# Patient Record
Sex: FEMALE | Race: ASIAN | URBAN - METROPOLITAN AREA
[De-identification: names, ages, dates, MRNs, and addresses within clinical notes are randomized per-mention and may not be internally consistent; named-entity substitution may affect disease eponyms.]

---

## 2017-03-28 ENCOUNTER — OFFICE VISIT (OUTPATIENT)
Dept: FAMILY MEDICINE | Facility: CLINIC | Age: 22
End: 2017-03-28

## 2017-03-28 VITALS
TEMPERATURE: 98.4 F | HEIGHT: 62 IN | SYSTOLIC BLOOD PRESSURE: 115 MMHG | HEART RATE: 98 BPM | DIASTOLIC BLOOD PRESSURE: 79 MMHG | BODY MASS INDEX: 19.55 KG/M2 | OXYGEN SATURATION: 99 % | WEIGHT: 106.25 LBS

## 2017-03-28 DIAGNOSIS — Z11.1 SCREENING EXAMINATION FOR PULMONARY TUBERCULOSIS: Primary | ICD-10-CM

## 2017-03-28 NOTE — PROGRESS NOTES
"Preceptor attestation:  Vital signs reviewed: /79 (BP Location: Left arm, Patient Position: Chair, Cuff Size: Adult Regular)  Pulse 98  Temp 98.4  F (36.9  C) (Oral)  Ht 5' 2\" (157.5 cm)  Wt 106 lb 4 oz (48.2 kg)  LMP 02/26/2017 (Within Weeks)  SpO2 99%  Breastfeeding? No  BMI 19.43 kg/m2    Patient seen and discussed with the resident. Assessment and plan reviewed with resident and agreed upon.    Supervising physician: Coral Aguila MD  [facility]  "

## 2017-03-28 NOTE — MR AVS SNAPSHOT
After Visit Summary   3/28/2017    Isaías Stone    MRN: [id number]           Patient Information     Date Of Birth          1995        Visit Information        Provider Department      3/28/2017 3:30 PM Amos Decker MD [facility]        Today's Diagnoses     Screening examination for pulmonary tuberculosis    -  1      Care Instructions    Please return for a nursing visit within the next 48-72 hours to have your skin test checked. Do not come earlier than 350pm on 3/30/17    Please set up an appointment for a 40 minute physical exam, you are due for a pap smear        Follow-ups after your visit        Who to contact     Please call your clinic at [phone] to:    Ask questions about your health    Make or cancel appointments    Discuss your medicines    Learn about your test results    Speak to your doctor   If you have compliments or concerns about an experience at your clinic, or if you wish to file a complaint, please contact [facility] Patient Relations at [phone] or email us at [email or web address]         Additional Information About Your Visit        MyChart Information     [email or web address] is an electronic gateway that provides easy, online access to your medical records. With [email or web address], you can request a clinic appointment, read your test results, renew a prescription or communicate with your care team.     To sign up for [email or web address] visit the website at [email or web address]   You will be asked to enter the access code listed below, as well as some personal information. Please follow the directions to create your username and password.     Your access code is: [email or web address]  Expires: 2017  4:05 PM     Your access code will  in 90 days. If you need help or a new code, please contact your [facility] or call [phone] for assistance.        Care EveryWhere ID     This is your Care EveryWhere ID.  "This could be used by other organizations to access your [place] medical records  [id number]        Your Vitals Were     Pulse Temperature Height Last Period Pulse Oximetry Breastfeeding?    98 98.4  F (36.9  C) (Oral) 5' 2\" (157.5 cm) 02/26/2017 (Within Weeks) 99% No    BMI (Body Mass Index)                   19.43 kg/m2            Blood Pressure from Last 3 Encounters:   03/28/17 115/79   02/15/16 117/77    Weight from Last 3 Encounters:   03/28/17 106 lb 4 oz (48.2 kg)   02/15/16 96 lb 6.4 oz (43.7 kg)              We Performed the Following     ADMIN VACCINE, INITIAL     TB INTRADERMAL TEST        Primary Care Provider Office Phone #    Amparo Milton [phone]       [facility] [address] [place] [address]        Thank you!     Thank you for choosing [facility]  for your care. Our goal is always to provide you with excellent care. Hearing back from our patients is one way we can continue to improve our services. Please take a few minutes to complete the written survey that you may receive in the mail after your visit with us. Thank you!             Your Updated Medication List - Protect others around you: Learn how to safely use, store and throw away your medicines at www.disposemymeds.org.      Notice  As of 3/28/2017  4:05 PM    You have not been prescribed any medications.      "

## 2017-03-28 NOTE — PATIENT INSTRUCTIONS
Please return for a nursing visit within the next 48-72 hours to have your skin test checked. Do not come earlier than 350pm on 3/30/17    Please set up an appointment for a 40 minute physical exam, you are due for a pap smear

## 2017-03-28 NOTE — NURSING NOTE
name: Cyndi Khan  Language: Karla  Agency: [employer]  Phone number: [phone]      The patient is asked the following questions today and these are her answers:    -Have you had a mantoux administered in the past 30 days?    No  -Have you had a previous positive Mantoux.  No  -Have you received BCG in the past.  No  -Have you had a live vaccine  (MMR, Varicella, OPV, Yellow Fever) in the last 6 weeks.  No  -Have you had and active  viral or bacterial infection in the past 6 weeks.  No  -Have you received corticosteroids or immunosuppressive agents in the past 6 weeks.  No  -Have you been diagnosed with HIV?  No  -Do you have a maglinancy?  No    Mantoux result:  Lab Results   Component Value Date    PPDREDNESS 20 03/30/2017    PPDINDURATIO 20 03/30/2017

## 2017-03-28 NOTE — PROGRESS NOTES
"      HPI:       Isaías Stone is a 21 year old who presents for the following  Patient presents with:  Mantoux Administration  Establish Care    Is applying to attend [employer] (she explains that it is like an extended high school) - she did not come in today with any forms for us to review. TB questionnaire reviewed by [facility] staff and was unremarkable and listed below. No family members with active tuberculosis per he knowledge.     -Have you had a mantoux administered in the past 30 days?   No  -Have you had a previous positive Mantoux. No  -Have you received BCG in the past. No  -Have you had a live vaccine (MMR, Varicella, OPV, Yellow Fever) in the last 6 weeks. No  -Have you had and active viral or bacterial infection in the past 6 weeks. No  -Have you received corticosteroids or immunosuppressive agents in the past 6 weeks. No  -Have you been diagnosed with HIV? No  -Do you have a maglinancy? No    PMH: no changes since last visit  Meds: none  Allergies: NKDA  SH: moved to [place] in 2014, no children    A Karla speaking  was used for this visit    Problem, Medication and Allergy Lists were reviewed and are current.  Patient is an established patient of this clinic.         Review of Systems:   Review of SystemsAs above          Physical Exam:     Patient Vitals for the past 24 hrs:   BP Temp Temp src Pulse SpO2 Height Weight   03/28/17 1538 115/79 98.4  F (36.9  C) Oral 98 99 % 5' 2\" (157.5 cm) 106 lb 4 oz (48.2 kg)     Body mass index is 19.43 kg/(m^2).  Vitals were reviewed and were normal     Physical Exam  GEN: NAD, AAox3, appears stated age, timid  CV: RRR no m/r/g, s1 and s2 noted  HEENT: EOMI, head normocephalic, sclera anicteric, trachea midline  PULM: clear bilaterally without wheezes/rhonchi/rales, non-labored  GAIT: normal  SKIN: no obvious rashes or abnormalities  PSYCH: euthymic, linear thoughts          Results:   No new labs or imaging  Assessment and Plan     1. Screening examination " for pulmonary tuberculosis  Return in 48-72 hours for nursing appointment to review skin test.     Follow-up : Encouraged patient to return for a 40 minute appointment for full physical exam. She is due for a pap smear.     There are no discontinued medications.  Options for treatment and follow-up care were reviewed with the patient. Isaías Stone  engaged in the decision making process and verbalized understanding of the options discussed and agreed with the final plan.      Amos Decker MD PGY2  [facility]  [phone]

## 2017-03-30 ENCOUNTER — OFFICE VISIT (OUTPATIENT)
Dept: FAMILY MEDICINE | Facility: CLINIC | Age: 22
End: 2017-03-30

## 2017-03-30 VITALS
OXYGEN SATURATION: 100 % | DIASTOLIC BLOOD PRESSURE: 71 MMHG | SYSTOLIC BLOOD PRESSURE: 111 MMHG | BODY MASS INDEX: 19.46 KG/M2 | WEIGHT: 106.4 LBS | HEART RATE: 75 BPM | TEMPERATURE: 98.5 F

## 2017-03-30 DIAGNOSIS — Z22.7 LATENT TUBERCULOSIS: ICD-10-CM

## 2017-03-30 DIAGNOSIS — R76.11 TUBERCULIN SKIN TEST (TST) POSITIVE: Primary | ICD-10-CM

## 2017-03-30 LAB
PPDINDURATION: 20 MM (ref 0–5)
PPDREDNESS: 20 MM

## 2017-03-30 RX ORDER — ISONIAZID 300 MG/1
300 TABLET ORAL DAILY
Qty: 30 TABLET | Refills: 1 | Status: SHIPPED | OUTPATIENT
Start: 2017-03-30

## 2017-03-30 RX ORDER — PYRIDOXINE HCL (VITAMIN B6) 25 MG
25 TABLET ORAL DAILY
Qty: 30 TABLET | Refills: 1 | Status: SHIPPED | OUTPATIENT
Start: 2017-03-30

## 2017-03-30 NOTE — MR AVS SNAPSHOT
After Visit Summary   3/30/2017    Isaías Stone    MRN: [id number]           Patient Information     Date Of Birth          1995        Visit Information        Provider Department      3/30/2017 3:30 PM Henrietta Hutson MD [facility]        Today's Diagnoses     Tuberculin skin test (TST) positive    -  1    Latent tuberculosis          Care Instructions    -Take INH and vitamin B6 daily  -Let us know if you notice yellowing of skin or eyes, abdominal pain, nausea or vomiting, night sweats, or hemoptysis  -Let us know if you plan on becoming pregnant    Return to clinic in one month for latent TB treatment follow up        Follow-ups after your visit        Follow-up notes from your care team     Return in about 4 weeks (around 2017) for Latent TB Treatment Follow Up.      Who to contact     Please call your clinic at [phone] to:    Ask questions about your health    Make or cancel appointments    Discuss your medicines    Learn about your test results    Speak to your doctor   If you have compliments or concerns about an experience at your clinic, or if you wish to file a complaint, please contact [facility] Patient Relations at [phone] or email us at [email or web address]         Additional Information About Your Visit        MyChart Information     Oxonicat is an electronic gateway that provides easy, online access to your medical records. With Fitwall, you can request a clinic appointment, read your test results, renew a prescription or communicate with your care team.     To sign up for Oxonicat visit the website at [email or web address]   You will be asked to enter the access code listed below, as well as some personal information. Please follow the directions to create your username and password.     Your access code is: [email or web address]  Expires: 2017  4:05 PM     Your access code will  in 90 days. If you need help or  a new code, please contact your [facility] or call [phone] for assistance.        Care EveryWhere ID     This is your Care EveryWhere ID. This could be used by other organizations to access your [place] medical records  [id number]        Your Vitals Were     Pulse Temperature Last Period Pulse Oximetry BMI (Body Mass Index)       75 98.5  F (36.9  C) (Oral) 02/26/2017 (Within Weeks) 100% 19.46 kg/m2        Blood Pressure from Last 3 Encounters:   03/30/17 111/71   03/28/17 115/79   02/15/16 117/77    Weight from Last 3 Encounters:   03/30/17 106 lb 6.4 oz (48.3 kg)   03/28/17 106 lb 4 oz (48.2 kg)   02/15/16 96 lb 6.4 oz (43.7 kg)              We Performed the Following     XR CHEST 2 VW          Today's Medication Changes          These changes are accurate as of: 3/30/17  4:14 PM.  If you have any questions, ask your nurse or doctor.               Start taking these medicines.        Dose/Directions    isoniazid 300 MG tablet   Commonly known as:  NYDRAZID   Used for:  Latent tuberculosis   Started by:  Henrietta Hutson MD        Dose:  300 mg   Take 1 tablet (300 mg) by mouth daily   Quantity:  30 tablet   Refills:  1       pyridOXINE 25 MG tablet   Commonly known as:  vitamin B-6   Used for:  Latent tuberculosis   Started by:  Henrietta Hutson MD        Dose:  25 mg   Take 1 tablet (25 mg) by mouth daily   Quantity:  30 tablet   Refills:  1            Where to get your medicines      These medications were sent to [facility] - [place], [place] - [address], [place] [address]     Phone:  [phone]     isoniazid 300 MG tablet    pyridOXINE 25 MG tablet                Primary Care Provider Office Phone #    Amparo Milton [phone]       [facility] [address] [place] [address]        Thank you!     Thank you for choosing [facility]  for your care. Our goal is always to provide you with  excellent care. Hearing back from our patients is one way we can continue to improve our services. Please take a few minutes to complete the written survey that you may receive in the mail after your visit with us. Thank you!             Your Updated Medication List - Protect others around you: Learn how to safely use, store and throw away your medicines at www.disposemymeds.org.          This list is accurate as of: 3/30/17  4:14 PM.  Always use your most recent med list.                   Brand Name Dispense Instructions for use    isoniazid 300 MG tablet    NYDRAZID    30 tablet    Take 1 tablet (300 mg) by mouth daily       pyridOXINE 25 MG tablet    vitamin B-6    30 tablet    Take 1 tablet (25 mg) by mouth daily

## 2017-03-30 NOTE — PATIENT INSTRUCTIONS
-Take INH and vitamin B6 daily  -Let us know if you notice yellowing of skin or eyes, abdominal pain, nausea or vomiting, night sweats, or hemoptysis  -Let us know if you plan on becoming pregnant    Return to clinic in one month for latent TB treatment follow up

## 2017-03-30 NOTE — PROGRESS NOTES
[address] [address]  [phone]         SUBJECTIVE       Ta N Stone is a 21 year old  female with a PMH significant for:   There is no problem list on file for this patient.    She presents with a positive TST.    She is a [place] refugee who arrived in the  in 8/27/13. She had a negative quantiferon gold 10/7/2013 at [facility] at her initial refugee screen. She also had a negative CXR on March 1, 2013 at her pre-departure exam.  She has never had a positive TST before. She has never received BCG in the past. She has no hx of immunosuppression, HIV, or malignancy. No family or friends with TB. No known exposure to TB.    No cough, SOB, fevers, hemoptysis, weight loss, chest pain, abdominal, myalgias. Has occasional mild headaches that self resolve.    Not currently sexually active and no plans on becoming pregnant in the next year. LMP was 2/28/17.     Past Medical History:  No past medical history on file.    Past Surgical History:  No past surgical history on file.    Family History:  Family History   Problem Relation Age of Onset     DIABETES No family hx of      Coronary Artery Disease No family hx of      Other Cancer No family hx of        Social History:  Social History     Social History     Marital status: Single     Spouse name: N/A     Number of children: N/A     Years of education: N/A     Occupational History     Not on file.     Social History Main Topics     Smoking status: Never Smoker     Smokeless tobacco: Never Used     Alcohol use No     Drug use: Not on file     Sexual activity: Not on file     Other Topics Concern     Not on file     Social History Narrative       Medications:   No current outpatient prescriptions on file.       Allergies:     Allergies   Allergen Reactions     Nka [No Known Allergies]        PMH, Surgical Hx, Family Hx, Social Hx, Medications and Allergies were reviewed and updated as needed.        REVIEW OF SYSTEMS     Please see  HPI        OBJECTIVE     Vitals:    03/30/17 1542   BP: 111/71   Pulse: 75   Temp: 98.5  F (36.9  C)   TempSrc: Oral   SpO2: 100%   Weight: 106 lb 6.4 oz (48.3 kg)     Body mass index is 19.46 kg/(m^2).    Constitutional: Awake, alert, cooperative, young [place] female in no apparent distress, and appears stated age. Seen with the assistance of a professional [place] .   Eyes: Lids and lashes normal, pupils equal, round and reactive to light, extra ocular muscles intact, sclera clear, conjunctiva normal.  Lungs: No increased work of breathing, good air exchange, clear to auscultation bilaterally, no crackles or wheezing.  Cardiovascular: Regular rate and rhythm, normal S1 and S2, no S3 or S4, and no murmur noted.  Abdomen: No scars, normal bowel sounds, soft, non-distended, non-tender, no masses palpated, no hepatosplenomegally.  Musculoskeletal: No redness, warmth, or swelling of the joints.  Full range of motion noted.  Motor strength is 5 out of 5 all extremities bilaterally.  Neurologic: Awake, alert, oriented to name, place and time.  Cranial nerves II-XII are grossly intact.  Motor is 5 out of 5 bilaterally. Gait is normal.  Neuropsychiatric: Normal affect, mood, orientation, memory and insight.  Skin: 20 mm TST on left arm.     3/31/17 TST: 20 mm induration  3/31/17 CXR: Per my preliminary read, no focal infiltrate, mass or consolidation.    ASSESSMENT AND PLAN     Isaías Stone is a 21 year old [place] refugee who immigrated to the  in 2013 and was seen today for mantoux results reading that was found to be positive.     Tuberculin skin test (TST) positive  Latent tuberculosis: Had negative CXR and Quantiferon Gold in 2013 at [facility] as part of new refugee screen. Asymptomatic with no known exposures. TST was positive with 20 mm induration and preliminary CXR read was negative today. She had normal LFT's at [facility] in 2013. As she is young with no significant past medical hx or liver disease  risk factors, there is no need to recheck baseline LFTs today but if she does become symptomatic or there is suspicion for liver dysfunction, would check LFTs at that time. No contraindication to starting INH and vitamin B6 today. Will anticipate 9 month course and check in with patient every month for monitoring of symptoms, medication compliance, and side effects.  -     XR CHEST 2 VW: Negative per my read. If there is any new findings on final Radiologist read, will call patient to let her know  -     isoniazid (NYDRAZID) 300 MG tablet; Take 1 tablet (300 mg) by mouth daily  -     pyridOXINE (VITAMIN B-6) 25 MG tablet; Take 1 tablet (25 mg) by mouth daily  -Instructed patient to inform clinic of any active TB symptoms, liver dysfunction symptoms, or if she were to become sexually active    RTC in 1 month for follow up of latent TB treatment or sooner if develops new or worsening symptoms.    Options for treatment and/or follow-up care were reviewed with the patient who was engaged and actively involved in the decision making process and verbalized understanding of the options discussed and was satisfied with the final plan.    Henrietta Hutson MD PGY-2  [facility]  Pager: [phone]    Patient discussed with Dr. Angel Paulino, attending physician, who agrees with the plan.

## 2017-03-31 ENCOUNTER — TELEPHONE (OUTPATIENT)
Dept: FAMILY MEDICINE | Facility: CLINIC | Age: 22
End: 2017-03-31

## 2017-03-31 PROBLEM — Z22.7 LATENT TUBERCULOSIS: Status: ACTIVE | Noted: 2017-03-31

## 2017-03-31 PROBLEM — R76.11 TUBERCULIN SKIN TEST (TST) POSITIVE: Status: RESOLVED | Noted: 2017-03-31 | Resolved: 2017-03-31

## 2017-03-31 PROBLEM — R76.11 TUBERCULIN SKIN TEST (TST) POSITIVE: Status: ACTIVE | Noted: 2017-03-31

## 2017-03-31 NOTE — PROGRESS NOTES
Preceptor attestation:  Patient seen and discussed with the resident. Assessment and plan reviewed with resident and agreed upon.  Supervising physician: Angel Paulino  [facility]

## 2017-03-31 NOTE — TELEPHONE ENCOUNTER
[facility] phone call message- general phone call:    Reason for call: She was in yesterday and seen  she got an tb xray and they need the results and she was prescribed some medication she has questions about.    Return call needed: Yes    OK to leave a message on voice mail? Yes    Primary language: Karla      needed? Yes    Call taken on March 31, 2017 at 2:46 PM by Justus Soriano

## 2017-04-04 NOTE — TELEPHONE ENCOUNTER
Noted, patient was seen on 3/30/17 for positive PPD and had negative CXR. Started on vitamin B6 and INH for latent TB treatment.     Once CHARLY obtained, can provide any information needed for Marilee.

## 2017-04-04 NOTE — TELEPHONE ENCOUNTER
Marilee the patient's is calling with regards to patients x ray results and medication. There is not a CHARLY on file. Marilee will get one once spring break is over and faxed it to the clinic. /GUS Bullock  Routed to Dr. Stone

## 2017-04-25 ENCOUNTER — TELEPHONE (OUTPATIENT)
Dept: FAMILY MEDICINE | Facility: CLINIC | Age: 22
End: 2017-04-25

## 2017-12-31 ENCOUNTER — HEALTH MAINTENANCE LETTER (OUTPATIENT)
Age: 22
End: 2017-12-31

## 2021-11-14 NOTE — PROGRESS NOTES
3/30/17 Lung CXR:  Lungs are clear. Heart and pulmonary vascularity are normal. No adenopathy, pleural effusions, or evidence of active disease.    Final read c/w preliminary read discussed with patient in clinic. No further action required. ambulatory